# Patient Record
Sex: MALE | NOT HISPANIC OR LATINO | Employment: UNEMPLOYED | URBAN - METROPOLITAN AREA
[De-identification: names, ages, dates, MRNs, and addresses within clinical notes are randomized per-mention and may not be internally consistent; named-entity substitution may affect disease eponyms.]

---

## 2017-10-27 ENCOUNTER — APPOINTMENT (OUTPATIENT)
Dept: RADIOLOGY | Facility: MEDICAL CENTER | Age: 37
End: 2017-10-27
Attending: EMERGENCY MEDICINE
Payer: MEDICAID

## 2017-10-27 ENCOUNTER — HOSPITAL ENCOUNTER (EMERGENCY)
Facility: MEDICAL CENTER | Age: 37
End: 2017-10-27
Attending: EMERGENCY MEDICINE
Payer: MEDICAID

## 2017-10-27 VITALS
OXYGEN SATURATION: 96 % | BODY MASS INDEX: 23.1 KG/M2 | DIASTOLIC BLOOD PRESSURE: 79 MMHG | HEART RATE: 80 BPM | RESPIRATION RATE: 18 BRPM | WEIGHT: 165 LBS | HEIGHT: 71 IN | SYSTOLIC BLOOD PRESSURE: 110 MMHG | TEMPERATURE: 97.9 F

## 2017-10-27 DIAGNOSIS — S62.616A DISPLACED FRACTURE OF PROXIMAL PHALANX OF RIGHT LITTLE FINGER, INITIAL ENCOUNTER FOR CLOSED FRACTURE: ICD-10-CM

## 2017-10-27 PROCEDURE — 99284 EMERGENCY DEPT VISIT MOD MDM: CPT

## 2017-10-27 PROCEDURE — 73140 X-RAY EXAM OF FINGER(S): CPT | Mod: RT

## 2017-10-27 PROCEDURE — 700102 HCHG RX REV CODE 250 W/ 637 OVERRIDE(OP): Performed by: EMERGENCY MEDICINE

## 2017-10-27 PROCEDURE — A9270 NON-COVERED ITEM OR SERVICE: HCPCS | Performed by: EMERGENCY MEDICINE

## 2017-10-27 RX ORDER — HYDROCODONE BITARTRATE AND ACETAMINOPHEN 5; 325 MG/1; MG/1
1-2 TABLET ORAL EVERY 4 HOURS PRN
Qty: 15 TAB | Refills: 0 | Status: SHIPPED | OUTPATIENT
Start: 2017-10-27 | End: 2020-02-03

## 2017-10-27 RX ORDER — IBUPROFEN 200 MG
200 TABLET ORAL EVERY 6 HOURS PRN
COMMUNITY
End: 2020-02-03

## 2017-10-27 RX ORDER — HYDROCODONE BITARTRATE AND ACETAMINOPHEN 5; 325 MG/1; MG/1
2 TABLET ORAL ONCE
Status: COMPLETED | OUTPATIENT
Start: 2017-10-27 | End: 2017-10-27

## 2017-10-27 RX ADMIN — HYDROCODONE BITARTRATE AND ACETAMINOPHEN 2 TABLET: 5; 325 TABLET ORAL at 18:03

## 2017-10-27 ASSESSMENT — PAIN SCALES - GENERAL: PAINLEVEL_OUTOF10: 4

## 2017-10-28 NOTE — DISCHARGE INSTRUCTIONS
Finger Fracture  Fractures of fingers are breaks in the bones of the fingers. There are many types of fractures. There are different ways of treating these fractures. Your health care provider will discuss the best way to treat your fracture.  CAUSES  Traumatic injury is the main cause of broken fingers. These include:  · Injuries while playing sports.  · Workplace injuries.  · Falls.  RISK FACTORS  Activities that can increase your risk of finger fractures include:  · Sports.  · Workplace activities that involve machinery.  · A condition called osteoporosis, which can make your bones less dense and cause them to fracture more easily.  SIGNS AND SYMPTOMS  The main symptoms of a broken finger are pain and swelling within 15 minutes after the injury. Other symptoms include:  · Bruising of your finger.  · Stiffness of your finger.  · Numbness of your finger.  · Exposed bones (compound fracture) if the fracture is severe.  DIAGNOSIS   The best way to diagnose a broken bone is with X-ray imaging. Additionally, your health care provider will use this X-ray image to evaluate the position of the broken finger bones.   TREATMENT   Finger fractures can be treated with:   · Nonreduction--This means the bones are in place. The finger is splinted without changing the positions of the bone pieces. The splint is usually left on for about a week to 10 days. This will depend on your fracture and what your health care provider thinks.  · Closed reduction--The bones are put back into position without using surgery. The finger is then splinted.  · Open reduction and internal fixation--The fracture site is opened. Then the bone pieces are fixed into place with pins or some type of hardware. This is seldom required. It depends on the severity of the fracture.  HOME CARE INSTRUCTIONS   · Follow your health care provider's instructions regarding activities, exercises, and physical therapy.  · Only take over-the-counter or prescription  medicines for pain, discomfort, or fever as directed by your health care provider.  SEEK MEDICAL CARE IF:  You have pain or swelling that limits the motion or use of your fingers.  SEEK IMMEDIATE MEDICAL CARE IF:   Your finger becomes numb.  MAKE SURE YOU:   · Understand these instructions.  · Will watch your condition.  · Will get help right away if you are not doing well or get worse.     This information is not intended to replace advice given to you by your health care provider. Make sure you discuss any questions you have with your health care provider.     Document Released: 04/01/2002 Document Revised: 10/08/2014 Document Reviewed: 07/30/2014  [employer] Interactive Patient Education ©2016 [employer]

## 2017-10-28 NOTE — ED NOTES
"Chief Complaint   Patient presents with   • Digit Pain     R pinky swelling, painful, got caught in dog's collar 2 days ago     Reports finger got caught, was not immediately swollen. Pt can only bend at first knuckle.       /80   Pulse 89   Temp 36.6 °C (97.9 °F) (Temporal)   Resp 18   Ht 1.803 m (5' 11\")   Wt 74.8 kg (165 lb)   SpO2 96%   BMI 23.01 kg/m²       Pt Informed regarding triage process and verbalized understanding to inform triage tech or RN for any changes in condition.  Placed in lobby.    "

## 2017-10-28 NOTE — ED PROVIDER NOTES
ED Provider Note    CHIEF COMPLAINT  Chief Complaint   Patient presents with   • Digit Pain     R pinky swelling, painful, got caught in dog's collar 2 days ago       HPI  Stepan Garcia is a 37 y.o. male who presents for evaluation of right 5th digit injury. Patient reports 2 days ago his right finger got caught in dog collar as his dog yanked away. He specifically denies any bite or puncture wound. He thought he would give it a few days but the patient reports the finger is been persistently swollen ecchymotic and he is unable to bend at the PIP joint. No other injuries reported no other complaints    REVIEW OF SYSTEMS  See HPI for further details. Fever chills numbness weakness or tingling All other systems are negative.     PAST MEDICAL HISTORY  Past Medical History:   Diagnosis Date   • History of left hip replacement 6/27/2016       FAMILY HISTORY  No history of bleeding disorder    SOCIAL HISTORY  Social History     Social History   • Marital status: Single     Spouse name: N/A   • Number of children: N/A   • Years of education: N/A     Social History Main Topics   • Smoking status: Current Every Day Smoker     Packs/day: 1.50     Types: Cigarettes   • Smokeless tobacco: Not on file      Comment: 1 ppd   • Alcohol use Yes      Comment: 3 beer every 2-3 days   • Drug use:      Types: Marijuana      Comment: marijuana, last use 8/3/10   • Sexual activity: Not on file     Other Topics Concern   • Not on file     Social History Narrative   • No narrative on file     Cigarettes denies IV drugs  SURGICAL HISTORY  Past Surgical History:   Procedure Laterality Date   • HIP ARTHROPLASTY TOTAL Left        CURRENT MEDICATIONS  Home Medications     Reviewed by Mia Kincaid R.N. (Registered Nurse) on 10/27/17 at 1719  Med List Status: Complete   Medication Last Dose Status   ibuprofen (MOTRIN) 200 MG Tab prn Active                ALLERGIES  Allergies   Allergen Reactions   • Morphine Sulfate        PHYSICAL  "EXAM  VITAL SIGNS: /80   Pulse 89   Temp 36.6 °C (97.9 °F) (Temporal)   Resp 18   Ht 1.803 m (5' 11\")   Wt 74.8 kg (165 lb)   SpO2 96%   BMI 23.01 kg/m²       Constitutional: Well developed, Well nourished, No acute distress, Non-toxic appearance.   HENT: Normocephalic, Atraumatic, Bilateral external ears normal, Oropharynx moist, No oral exudates, Nose normal.   Eyes: PERRLA, EOMI, Conjunctiva normal, No discharge.   Neck: Normal range of motion, No tenderness, Supple, No stridor.   Cardiovascular: Normal heart rate, Normal rhythm, No murmurs, No rubs, No gallops.   Thorax & Lungs: Normal breath sounds, No respiratory distress, No wheezing, No chest tenderness.   Abdomen: Bowel sounds normal, Soft, No tenderness, No masses, No pulsatile masses.   Skin: Warm, Dry, No erythema, No rash.   Extremities: Right hand exam is notable for ecchymosis and bruising over the PIP joint of the right 5th digit. Distal capillary refill is normal sensation is normal. There is bony tenderness diffusely throughout the digit no erythema no warmth  Neurologic: Alert & oriented x 3, Normal motor function, Normal sensory function, No focal deficits noted.   Psychiatric: Anxious        RADIOLOGY/PROCEDURES  DX-FINGER(S) 2+ RIGHT   Final Result      1.  Possible minimally displaced articular fracture at the base of RIGHT 5th middle phalanx eccentric to the radial side.   2.  No dislocation.   3.  Evidence of old trauma to the 5th metacarpal.            COURSE & MEDICAL DECISION MAKING  Pertinent Labs & Imaging studies reviewed. (See chart for details)  Patient does indeed likely have a proximal phalanx fracture. I will place him in an AlumaFoam splint. I'll give him a small prescription of pain medicine and refer him to hand surgery with Dr. Velázquez    FINAL IMPRESSION  1. Right 5th digit fracture, proximal phalanx      Electronically signed by: Kael Spears, 10/27/2017 5:18 PM    "

## 2017-10-28 NOTE — ED NOTES
Finger splint applied by erp In right 5th digit finger, CMS intact.  Discharge instructions given to pt including follow up w/orthoapedic doctor or to return for any worsening symptoms.  Prescription for pain given to pt and instructed no drinking no driving while on prescribed pain medication.  No new complaints made. Also instructed to elevate right hand to help w/the swelling. Pt verbalized relief of pain from medication and splint.

## 2019-06-26 ENCOUNTER — TELEPHONE (OUTPATIENT)
Dept: MEDICAL GROUP | Facility: MEDICAL CENTER | Age: 39
End: 2019-06-26

## 2019-06-26 NOTE — TELEPHONE ENCOUNTER
Left message with patient's friend to call me back so I can discuss the no show.  We will send letter in mail about first no show to appointment today 6/26/19.

## 2020-02-03 ENCOUNTER — HOSPITAL ENCOUNTER (EMERGENCY)
Facility: MEDICAL CENTER | Age: 40
End: 2020-02-03
Attending: EMERGENCY MEDICINE
Payer: MEDICAID

## 2020-02-03 VITALS
OXYGEN SATURATION: 97 % | HEIGHT: 71 IN | TEMPERATURE: 98.2 F | RESPIRATION RATE: 16 BRPM | DIASTOLIC BLOOD PRESSURE: 89 MMHG | BODY MASS INDEX: 21.39 KG/M2 | WEIGHT: 152.78 LBS | SYSTOLIC BLOOD PRESSURE: 141 MMHG | HEART RATE: 93 BPM

## 2020-02-03 DIAGNOSIS — S01.81XA FACIAL LACERATION, INITIAL ENCOUNTER: Primary | ICD-10-CM

## 2020-02-03 PROCEDURE — 90471 IMMUNIZATION ADMIN: CPT

## 2020-02-03 PROCEDURE — 700111 HCHG RX REV CODE 636 W/ 250 OVERRIDE (IP): Performed by: EMERGENCY MEDICINE

## 2020-02-03 PROCEDURE — A9270 NON-COVERED ITEM OR SERVICE: HCPCS | Performed by: EMERGENCY MEDICINE

## 2020-02-03 PROCEDURE — 90715 TDAP VACCINE 7 YRS/> IM: CPT | Performed by: EMERGENCY MEDICINE

## 2020-02-03 PROCEDURE — 700102 HCHG RX REV CODE 250 W/ 637 OVERRIDE(OP): Performed by: EMERGENCY MEDICINE

## 2020-02-03 PROCEDURE — 99284 EMERGENCY DEPT VISIT MOD MDM: CPT

## 2020-02-03 PROCEDURE — 304217 HCHG IRRIGATION SYSTEM

## 2020-02-03 RX ORDER — CEPHALEXIN 500 MG/1
500 CAPSULE ORAL ONCE
Status: COMPLETED | OUTPATIENT
Start: 2020-02-03 | End: 2020-02-03

## 2020-02-03 RX ORDER — IBUPROFEN 600 MG/1
600 TABLET ORAL ONCE
Status: COMPLETED | OUTPATIENT
Start: 2020-02-03 | End: 2020-02-03

## 2020-02-03 RX ORDER — CEPHALEXIN 500 MG/1
500 CAPSULE ORAL 4 TIMES DAILY
Qty: 20 CAP | Refills: 0 | Status: SHIPPED | OUTPATIENT
Start: 2020-02-03 | End: 2020-02-08

## 2020-02-03 RX ADMIN — CLOSTRIDIUM TETANI TOXOID ANTIGEN (FORMALDEHYDE INACTIVATED), CORYNEBACTERIUM DIPHTHERIAE TOXOID ANTIGEN (FORMALDEHYDE INACTIVATED), BORDETELLA PERTUSSIS TOXOID ANTIGEN (GLUTARALDEHYDE INACTIVATED), BORDETELLA PERTUSSIS FILAMENTOUS HEMAGGLUTININ ANTIGEN (FORMALDEHYDE INACTIVATED), BORDETELLA PERTUSSIS PERTACTIN ANTIGEN, AND BORDETELLA PERTUSSIS FIMBRIAE 2/3 ANTIGEN 0.5 ML: 5; 2; 2.5; 5; 3; 5 INJECTION, SUSPENSION INTRAMUSCULAR at 18:53

## 2020-02-03 RX ADMIN — IBUPROFEN 600 MG: 600 TABLET ORAL at 18:59

## 2020-02-03 RX ADMIN — CEPHALEXIN 500 MG: 500 CAPSULE ORAL at 18:53

## 2020-02-03 ASSESSMENT — ENCOUNTER SYMPTOMS
DIZZINESS: 0
LOSS OF CONSCIOUSNESS: 0
EYE REDNESS: 0
EYE PAIN: 0
BLURRED VISION: 0
HEADACHES: 0

## 2020-02-04 NOTE — ED PROVIDER NOTES
ED Provider Note   2/3/2020  5:40 PM    Means of Arrival: Walk In  History obtained by: patient  Limitations:none    CHIEF COMPLAINT  Chief Complaint   Patient presents with   • T-5000 Head Injury   • Laceration       HPI  Stepan Garcia is a 39 y.o. male with concerns of wound to his face.  Approximately 9 PM last night, 20 hours ago.  He was carrying objects when he tripped and fell forward.  He was unable to brace his fall.  His face fell directly onto asphalt.  There is no loss of consciousness.  He sustained a wound to his right brow.  He washed the wound out with warm water and applied Neosporin.  He decided to come in today because the wound was gaping.  Denies any eye pain.  No visual changes.  No headache.  He does not believe his tetanus vaccine is up-to-date.    REVIEW OF SYSTEMS  Review of Systems   Eyes: Negative for blurred vision, pain and redness.   Neurological: Negative for dizziness, loss of consciousness and headaches.     See HPI for further details.     PAST MEDICAL HISTORY   has a past medical history of History of left hip replacement (6/27/2016).    SOCIAL HISTORY  Social History     Tobacco Use   • Smoking status: Current Every Day Smoker     Packs/day: 1.50     Types: Cigarettes   • Tobacco comment: 1 ppd   Substance and Sexual Activity   • Alcohol use: Yes     Comment: 3 beer every 2-3 days   • Drug use: Yes     Types: Marijuana     Comment: THC   • Sexual activity: Not on file       SURGICAL HISTORY   has a past surgical history that includes hip arthroplasty total (Left).    CURRENT MEDICATIONS  Home Medications     Reviewed by Swetha Bustillos R.N. (Registered Nurse) on 02/03/20 at 1710  Med List Status: Complete   Medication Last Dose Status        Patient Yovani Taking any Medications                       ALLERGIES  Allergies   Allergen Reactions   • Morphine Sulfate        PHYSICAL EXAM  VITAL SIGNS: /89   Pulse 93   Temp 36.8 °C (98.2 °F) (Temporal)   Resp 16    "Ht 1.803 m (5' 11\")   Wt 69.3 kg (152 lb 12.5 oz)   SpO2 97%   BMI 21.31 kg/m²    Pulse ox interpretation: I interpret this pulse ox as normal.  Constitutional: Alert in no apparent distress.  HENT: see diagram.   Eyes: Pupils are equal. Conjunctiva normal, non-icteric.  Normal extraocular movements.  Heart: Regular rate and rythm, no murmurs.    Lungs: No respiratory distress, regular respirations.   Skin: Warm, Dry, see diagram  Neurologic: Alert, Grossly non-focal. No slurred speech. Moving extremities normally.   Psychiatric: Affect normal, Judgment normal, Mood normal, Appears appropriate and not intoxicated.   Physical Exam   HENT:   Head:       Nursing note and vitals reviewed.        COURSE & MEDICAL DECISION MAKING  Pertinent Labs & Imaging studies reviewed. (See chart for details)    5:40 PM This is an emergent evaluation of a 39 y.o., male who presents with laceration to right superior lateral orbit region.  It has been close to 24 hours since injury.  Will not close wound with sutures or glue.  The wounds will be irrigated.  Will approximate with Steri-Strips.  He will also be prescribed antibiotics.    7:36 PM  Wound was irrigated.  Loosely approximated with Steri-Strips.  He was given a dose of Keflex here and given a prescription for prophylactic treatment of infection.  Asked return if any signs of infection.  Tdap was updated.     The patient will return for worsening symptoms and is stable at the time of discharge. The patient verbalizes understanding. Guidance was provided on appropriate use of medications including driving under the influence, overdose, and side effects.     FINAL IMPRESSION  1. Facial laceration, initial encounter               Electronically signed by: Benjie Cook II, M.D., 2/3/2020 5:40 PM          "

## 2020-02-04 NOTE — ED TRIAGE NOTES
Pt comes in complaining of a GLF last night around 2200. Pt denies LOC. Laceration to head/swelling above eyebrow